# Patient Record
Sex: MALE | URBAN - METROPOLITAN AREA
[De-identification: names, ages, dates, MRNs, and addresses within clinical notes are randomized per-mention and may not be internally consistent; named-entity substitution may affect disease eponyms.]

---

## 2023-07-13 ENCOUNTER — HOSPITAL ENCOUNTER (OUTPATIENT)
Dept: TELEMEDICINE | Facility: HOSPITAL | Age: 63
Discharge: HOME | End: 2023-07-13

## 2023-07-13 PROCEDURE — 99448 PR INTERPROF, PHONE/INTERNET/EHR, CONSULT, 21-30 MINS: ICD-10-PCS | Mod: GT,,, | Performed by: PSYCHIATRY & NEUROLOGY

## 2023-07-13 PROCEDURE — 99448 NTRPROF PH1/NTRNET/EHR 21-30: CPT | Mod: GT,,, | Performed by: PSYCHIATRY & NEUROLOGY

## 2023-07-14 NOTE — CONSULTS
Brief Telestroke Note    Patient presenting with altered mental status, diffuse weakness, not opening eyes, asking for water.  Prior to change in mental status he clutched his chest.  On my exam patient will follow simple commands with encouragement.  All limbs antigravity.  No CN deficits.  Low suspicion for stroke at this time but given history of stroke recommend CTA head/neck.  Patient not a tPA candidate as he is on Eliquis.      Discussed with Dr. Hunt at [place].    Mercy Apodaca MD  Vascular Neurology